# Patient Record
Sex: MALE | Race: BLACK OR AFRICAN AMERICAN | NOT HISPANIC OR LATINO | URBAN - NONMETROPOLITAN AREA
[De-identification: names, ages, dates, MRNs, and addresses within clinical notes are randomized per-mention and may not be internally consistent; named-entity substitution may affect disease eponyms.]

---

## 2019-12-04 ENCOUNTER — IMPORTED ENCOUNTER (OUTPATIENT)
Dept: URBAN - NONMETROPOLITAN AREA CLINIC 1 | Facility: CLINIC | Age: 49
End: 2019-12-04

## 2019-12-04 PROBLEM — H52.13: Noted: 2019-12-04

## 2019-12-04 PROCEDURE — 92310 CONTACT LENS FITTING OU: CPT

## 2019-12-04 PROCEDURE — 92015 DETERMINE REFRACTIVE STATE: CPT

## 2019-12-04 PROCEDURE — 92004 COMPRE OPH EXAM NEW PT 1/>: CPT

## 2019-12-04 NOTE — PATIENT DISCUSSION
Myopia OUDiscussed refractive status in detail with patient. New glasses and CL Rx given today. Discussed proper lens care replacement and hygiene. Continue to monitor.

## 2022-04-09 ASSESSMENT — TONOMETRY
OD_IOP_MMHG: 19
OS_IOP_MMHG: 20

## 2022-04-09 ASSESSMENT — VISUAL ACUITY
OS_PH: 20/20
OD_SC: 20/25
OS_SC: 20/40+